# Patient Record
Sex: FEMALE | Race: WHITE | Employment: UNEMPLOYED | URBAN - METROPOLITAN AREA
[De-identification: names, ages, dates, MRNs, and addresses within clinical notes are randomized per-mention and may not be internally consistent; named-entity substitution may affect disease eponyms.]

---

## 2017-03-06 RX ORDER — METRONIDAZOLE 500 MG/1
500 TABLET ORAL 2 TIMES DAILY
Qty: 14 TABLET | Refills: 0 | Status: SHIPPED | OUTPATIENT
Start: 2017-03-06 | End: 2017-03-13

## 2017-03-14 ENCOUNTER — OFFICE VISIT (OUTPATIENT)
Dept: ORTHOPEDIC SURGERY | Age: 37
End: 2017-03-14
Payer: COMMERCIAL

## 2017-03-14 DIAGNOSIS — G25.81 RESTLESS LEG SYNDROME: Primary | ICD-10-CM

## 2017-03-14 PROCEDURE — 99212 OFFICE O/P EST SF 10 MIN: CPT | Performed by: ORTHOPAEDIC SURGERY

## 2017-03-14 ASSESSMENT — ENCOUNTER SYMPTOMS: SHORTNESS OF BREATH: 0

## 2017-04-05 RX ORDER — METRONIDAZOLE 500 MG/1
500 TABLET ORAL 2 TIMES DAILY
Qty: 14 TABLET | Refills: 0 | Status: SHIPPED | OUTPATIENT
Start: 2017-04-05 | End: 2017-04-11

## 2017-04-11 ENCOUNTER — OFFICE VISIT (OUTPATIENT)
Dept: OBGYN CLINIC | Age: 37
End: 2017-04-11
Payer: COMMERCIAL

## 2017-04-11 ENCOUNTER — HOSPITAL ENCOUNTER (OUTPATIENT)
Age: 37
Setting detail: SPECIMEN
Discharge: HOME | End: 2017-04-11
Payer: COMMERCIAL

## 2017-04-11 ENCOUNTER — HOSPITAL ENCOUNTER (OUTPATIENT)
Age: 37
Discharge: HOME | End: 2017-04-11
Payer: COMMERCIAL

## 2017-04-11 VITALS
BODY MASS INDEX: 23.54 KG/M2 | HEIGHT: 67 IN | SYSTOLIC BLOOD PRESSURE: 102 MMHG | DIASTOLIC BLOOD PRESSURE: 70 MMHG | WEIGHT: 150 LBS | RESPIRATION RATE: 16 BRPM | HEART RATE: 78 BPM

## 2017-04-11 DIAGNOSIS — N76.0 ACUTE VAGINITIS: Primary | ICD-10-CM

## 2017-04-11 DIAGNOSIS — Z20.2 POSSIBLE EXPOSURE TO STD: ICD-10-CM

## 2017-04-11 DIAGNOSIS — N76.0 ACUTE VAGINITIS: ICD-10-CM

## 2017-04-11 LAB
DIRECT EXAM: ABNORMAL
HEPATITIS C ANTIBODY: NONREACTIVE
HIV AG/AB: NONREACTIVE
Lab: ABNORMAL
SPECIMEN DESCRIPTION: ABNORMAL
SPECIMEN DESCRIPTION: ABNORMAL
STATUS: ABNORMAL
T. PALLIDUM, IGG: NONREACTIVE

## 2017-04-11 PROCEDURE — 87510 GARDNER VAG DNA DIR PROBE: CPT

## 2017-04-11 PROCEDURE — 99213 OFFICE O/P EST LOW 20 MIN: CPT | Performed by: NURSE PRACTITIONER

## 2017-04-11 PROCEDURE — 87480 CANDIDA DNA DIR PROBE: CPT

## 2017-04-11 PROCEDURE — 86803 HEPATITIS C AB TEST: CPT

## 2017-04-11 PROCEDURE — 87660 TRICHOMONAS VAGIN DIR PROBE: CPT

## 2017-04-11 PROCEDURE — 36415 COLL VENOUS BLD VENIPUNCTURE: CPT

## 2017-04-11 PROCEDURE — 87389 HIV-1 AG W/HIV-1&-2 AB AG IA: CPT

## 2017-04-11 PROCEDURE — 86694 HERPES SIMPLEX NES ANTBDY: CPT

## 2017-04-11 PROCEDURE — 86780 TREPONEMA PALLIDUM: CPT

## 2017-04-11 PROCEDURE — 86695 HERPES SIMPLEX TYPE 1 TEST: CPT

## 2017-04-11 PROCEDURE — 86696 HERPES SIMPLEX TYPE 2 TEST: CPT

## 2017-04-11 PROCEDURE — 87491 CHLMYD TRACH DNA AMP PROBE: CPT

## 2017-04-11 PROCEDURE — 87591 N.GONORRHOEAE DNA AMP PROB: CPT

## 2017-04-12 ENCOUNTER — TELEPHONE (OUTPATIENT)
Dept: OBGYN CLINIC | Age: 37
End: 2017-04-12

## 2017-04-12 PROBLEM — B00.9 ROUTINE CULTURE POSITIVE FOR HERPES SIMPLEX VIRUS TYPE 1 (HSV-1): Status: ACTIVE | Noted: 2017-04-12

## 2017-04-12 PROBLEM — B00.9 HSV-1 INFECTION: Status: ACTIVE | Noted: 2017-04-12

## 2017-04-12 PROBLEM — R76.8 HSV-2 SEROPOSITIVE: Status: ACTIVE | Noted: 2017-04-12

## 2017-04-12 LAB
C TRACH DNA GENITAL QL NAA+PROBE: NEGATIVE
HERPES SIMPLEX VIRUS 1 IGG: 3.06
HERPES SIMPLEX VIRUS 2 IGG: 5.4
HERPES TYPE 1/2 IGM COMBINED: 1.41
N. GONORRHOEAE DNA: NEGATIVE

## 2017-04-12 RX ORDER — METRONIDAZOLE 500 MG/1
500 TABLET ORAL 2 TIMES DAILY
Qty: 14 TABLET | Refills: 0 | Status: SHIPPED | OUTPATIENT
Start: 2017-04-12 | End: 2017-04-19

## 2017-09-20 ENCOUNTER — OFFICE VISIT (OUTPATIENT)
Dept: OBGYN CLINIC | Age: 37
End: 2017-09-20
Payer: COMMERCIAL

## 2017-09-20 ENCOUNTER — HOSPITAL ENCOUNTER (OUTPATIENT)
Age: 37
Setting detail: SPECIMEN
Discharge: HOME | End: 2017-09-20
Payer: COMMERCIAL

## 2017-09-20 VITALS
BODY MASS INDEX: 23.49 KG/M2 | HEART RATE: 70 BPM | DIASTOLIC BLOOD PRESSURE: 64 MMHG | HEIGHT: 68 IN | WEIGHT: 155 LBS | SYSTOLIC BLOOD PRESSURE: 100 MMHG

## 2017-09-20 DIAGNOSIS — N89.8 VAGINAL DISCHARGE: ICD-10-CM

## 2017-09-20 DIAGNOSIS — Z01.419 ROUTINE GYNECOLOGICAL EXAMINATION: Primary | ICD-10-CM

## 2017-09-20 LAB
DIRECT EXAM: ABNORMAL
Lab: ABNORMAL
SPECIMEN DESCRIPTION: ABNORMAL
SPECIMEN DESCRIPTION: ABNORMAL
STATUS: ABNORMAL

## 2017-09-20 PROCEDURE — 87491 CHLMYD TRACH DNA AMP PROBE: CPT

## 2017-09-20 PROCEDURE — 87591 N.GONORRHOEAE DNA AMP PROB: CPT

## 2017-09-20 PROCEDURE — 87660 TRICHOMONAS VAGIN DIR PROBE: CPT

## 2017-09-20 PROCEDURE — 99214 OFFICE O/P EST MOD 30 MIN: CPT | Performed by: NURSE PRACTITIONER

## 2017-09-20 PROCEDURE — 87510 GARDNER VAG DNA DIR PROBE: CPT

## 2017-09-20 PROCEDURE — 87480 CANDIDA DNA DIR PROBE: CPT

## 2017-09-20 RX ORDER — METRONIDAZOLE 500 MG/1
500 TABLET ORAL 2 TIMES DAILY
Qty: 14 TABLET | Refills: 0 | Status: SHIPPED | OUTPATIENT
Start: 2017-09-20 | End: 2017-09-27

## 2017-09-20 ASSESSMENT — PATIENT HEALTH QUESTIONNAIRE - PHQ9
1. LITTLE INTEREST OR PLEASURE IN DOING THINGS: 0
SUM OF ALL RESPONSES TO PHQ9 QUESTIONS 1 & 2: 0
SUM OF ALL RESPONSES TO PHQ QUESTIONS 1-9: 0
2. FEELING DOWN, DEPRESSED OR HOPELESS: 0

## 2017-09-21 ENCOUNTER — TELEPHONE (OUTPATIENT)
Dept: OBGYN CLINIC | Age: 37
End: 2017-09-21

## 2017-09-21 LAB
C TRACH DNA GENITAL QL NAA+PROBE: NEGATIVE
N. GONORRHOEAE DNA: NEGATIVE

## 2018-06-14 ENCOUNTER — TELEPHONE (OUTPATIENT)
Dept: OBGYN CLINIC | Age: 38
End: 2018-06-14

## 2018-06-14 RX ORDER — METRONIDAZOLE 500 MG/1
500 TABLET ORAL 2 TIMES DAILY
Qty: 14 TABLET | Refills: 0 | Status: SHIPPED | OUTPATIENT
Start: 2018-06-14 | End: 2018-06-21

## 2018-09-26 ENCOUNTER — TELEPHONE (OUTPATIENT)
Dept: OBGYN CLINIC | Age: 38
End: 2018-09-26

## 2018-10-01 DIAGNOSIS — S32.599D CLOSED FRACTURE OF INFERIOR PUBIC RAMUS, UNSPECIFIED LATERALITY, WITH ROUTINE HEALING, SUBSEQUENT ENCOUNTER: Primary | ICD-10-CM

## 2018-12-19 ENCOUNTER — TELEPHONE (OUTPATIENT)
Dept: OBGYN CLINIC | Age: 38
End: 2018-12-19

## 2018-12-19 RX ORDER — METRONIDAZOLE 500 MG/1
500 TABLET ORAL 2 TIMES DAILY
Qty: 14 TABLET | Refills: 0 | Status: SHIPPED | OUTPATIENT
Start: 2018-12-19 | End: 2018-12-26

## 2020-08-18 ENCOUNTER — HOSPITAL ENCOUNTER (EMERGENCY)
Age: 40
Discharge: HOME | End: 2020-08-18
Attending: EMERGENCY MEDICINE
Payer: COMMERCIAL

## 2020-08-18 VITALS
WEIGHT: 145 LBS | TEMPERATURE: 97.9 F | DIASTOLIC BLOOD PRESSURE: 79 MMHG | BODY MASS INDEX: 22.76 KG/M2 | HEIGHT: 67 IN | OXYGEN SATURATION: 100 % | SYSTOLIC BLOOD PRESSURE: 124 MMHG | RESPIRATION RATE: 16 BRPM | HEART RATE: 83 BPM

## 2020-08-18 PROCEDURE — 99283 EMERGENCY DEPT VISIT LOW MDM: CPT

## 2020-08-18 PROCEDURE — 6370000000 HC RX 637 (ALT 250 FOR IP): Performed by: STUDENT IN AN ORGANIZED HEALTH CARE EDUCATION/TRAINING PROGRAM

## 2020-08-18 RX ORDER — CYCLOBENZAPRINE HCL 5 MG
5 TABLET ORAL 2 TIMES DAILY PRN
Qty: 10 TABLET | Refills: 0 | Status: SHIPPED | OUTPATIENT
Start: 2020-08-18 | End: 2020-08-28

## 2020-08-18 RX ORDER — IBUPROFEN 800 MG/1
800 TABLET ORAL ONCE
Status: COMPLETED | OUTPATIENT
Start: 2020-08-18 | End: 2020-08-18

## 2020-08-18 RX ORDER — IBUPROFEN 800 MG/1
800 TABLET ORAL 3 TIMES DAILY PRN
Qty: 40 TABLET | Refills: 0 | Status: SHIPPED | OUTPATIENT
Start: 2020-08-18

## 2020-08-18 RX ADMIN — IBUPROFEN 800 MG: 800 TABLET, FILM COATED ORAL at 19:48

## 2020-08-18 ASSESSMENT — PAIN SCALES - GENERAL
PAINLEVEL_OUTOF10: 8
PAINLEVEL_OUTOF10: 8

## 2020-08-18 ASSESSMENT — ENCOUNTER SYMPTOMS
SHORTNESS OF BREATH: 0
ABDOMINAL PAIN: 0
BACK PAIN: 1

## 2020-08-18 ASSESSMENT — PAIN DESCRIPTION - ORIENTATION: ORIENTATION: LEFT;MID

## 2020-08-18 ASSESSMENT — PAIN DESCRIPTION - LOCATION: LOCATION: BACK;SHOULDER

## 2020-08-18 ASSESSMENT — PAIN DESCRIPTION - PAIN TYPE: TYPE: ACUTE PAIN

## 2020-08-18 ASSESSMENT — PAIN DESCRIPTION - DESCRIPTORS: DESCRIPTORS: ACHING;SHARP;NUMBNESS

## 2020-08-18 NOTE — ED PROVIDER NOTES
[facility] [address] [facility]                                      Emergency Department                                      Faculty Attestation                                         I performed a history and physical examination of the patient and discussed management with the resident. I reviewed the residents note and agree with the documented findings and plan of care. Any areas of disagreement are noted on the chart. I was personally present for the key portions of any procedures. I have documented in the chart those procedures where I was not present during the key portions. I agree with the chief complaint, past medical history, past surgical history, allergies, medications, social and family history as documented unless otherwise noted below. For mid-level providers such as nurse practitioners as well as physicians assistants:    I have personally seen and evaluated the patient. I find the patient's history and physical exam are consistent with NP/PA documentation. I agree with the care provided, treatment rendered, disposition, & follow-up plan. Additional findings are as noted  61-year-old female with left-sided back pain. Worse with movement of the left upper extremity. No chest pain, shortness of breath, diaphoresis, or other complaints. Tender palpation over the left rhomboids. Reproduces the pain exactly. Neurovascular and tendon intact. No evidence of trauma. No bony tenderness.      Silas Haddad DO  08/18/20 1944

## 2020-08-18 NOTE — ED PROVIDER NOTES
[facility] ED  Emergency Department Encounter  EmergencyMedicine Resident     Pt Name:Vikki Hanna  MRN: [id number]  [place] 1980  Date of evaluation: 8/18/20  PCP:  Amira Chin DO    CHIEF COMPLAINT       Chief Complaint   Patient presents with    Back Pain    Shoulder Pain       HISTORY OF PRESENT ILLNESS  (Location/Symptom, Timing/Onset, Context/Setting, Quality, Duration, Modifying Factors, Severity.)      Vito Salinas is a 42 y.o. female who presents with left sided back pain. Patient complaining of left sided back pain x1 day. States she had a muscle spasm in upper left back yesterday that gradually worsened and radiated to left shoulder and laterally. Exacerbated by movement. Partially relieved by ibuprofen. Denies sob, chest pain, diaphoresis, lightheadedness, trauma. PAST MEDICAL / SURGICAL / SOCIAL / FAMILY HISTORY      has a past medical history of Alcohol abuse, Anemia due to acute blood loss, Anxiety, C7 cervical fracture (HCC), Closed fracture of one rib, Right First, Delirium, Delirium, Drug abuse ([address] Fracture of left frontal bone ([address] History of infection with vancomycin resistant Enterococcus (VRE), Iliac artery thrombosis, right (HCC), L1-L5 Lumbar transverse process fracture ([address] Left Sacral fracture, closed ([address] Nasal bone fracture-commuinuted, Psychiatric problem, Right Inferior pubic ramus fracture ([address] Scalp laceration, Sphenoid sinus fracture ([address] T9-T10 Thoracic vertebral fracture ([address] and Thrombosis of right femoral vein ([address] has a past surgical history that includes Tubal ligation (6/13/08); skin split graft (10/17/2014); Trunk Debridement (10/17/14); other surgical history (12-17-14); hip surgery (2014); Pelvic fracture surgery; and orthopedic surgery (Right, 2/22/2016).     Social History     Socioeconomic History    Marital status: Legally      Spouse name: Not on file    Number of children: Not on file    Medications:  Prior to Admission medications    Medication Sig Start Date End Date Taking? Authorizing Provider   cyclobenzaprine (FLEXERIL) 5 MG tablet Take 1 tablet by mouth 2 times daily as needed for Muscle spasms 8/18/20 8/28/20 Yes Tulio Bell MD   ibuprofen (ADVIL;MOTRIN) 800 MG tablet Take 1 tablet by mouth 3 times daily as needed for Pain 8/18/20  Yes Tulio Bell MD       REVIEW OF SYSTEMS    (2-9 systems for level 4, 10 or more for level 5)      Review of Systems   Respiratory: Negative for shortness of breath. Cardiovascular: Negative for chest pain and palpitations. Gastrointestinal: Negative for abdominal pain. Musculoskeletal: Positive for back pain. Neurological: Negative for weakness, light-headedness, numbness and headaches. PHYSICAL EXAM   (up to 7 for level 4, 8 or more for level 5)      INITIAL VITALS:   /79   Pulse 83   Temp 97.9 °F (36.6 °C) (Oral)   Resp 16   Ht 5' 7\" (1.702 m)   Wt 145 lb (65.8 kg)   LMP 08/09/2020 (Approximate)   SpO2 100%   BMI 22.71 kg/m²     Physical Exam  Constitutional:       General: She is not in acute distress. Appearance: She is normal weight. Cardiovascular:      Rate and Rhythm: Normal rate and regular rhythm. Pulmonary:      Effort: Pulmonary effort is normal.      Breath sounds: Normal breath sounds. Musculoskeletal:         General: Tenderness (palpation left interscapular, paraspinal traps extending laterally, no sensory or motor deficits noted) present. DIFFERENTIAL  DIAGNOSIS     PLAN (LABS / IMAGING / EKG):  No orders of the defined types were placed in this encounter.       MEDICATIONS ORDERED:  Orders Placed This Encounter   Medications    ibuprofen (ADVIL;MOTRIN) tablet 800 mg    cyclobenzaprine (FLEXERIL) 5 MG tablet     Sig: Take 1 tablet by mouth 2 times daily as needed for Muscle spasms     Dispense:  10 tablet     Refill:  0    ibuprofen (ADVIL;MOTRIN) 800 MG tablet     Sig: Take 1 tablet by mouth 3 times daily as needed for Pain     Dispense:  40 tablet     Refill:  0       DDX:  MSK pain - back spasm  Atypical chest pain       DIAGNOSTIC RESULTS / EMERGENCY DEPARTMENT COURSE / MDM     LABS:  No results found for this visit on 08/18/20. RADIOLOGY:  No results found. EKG  None    All EKG's are interpreted by the Emergency Department Physician who either signs or Co-signs this chart in the absence of a cardiologist.    EMERGENCY DEPARTMENT COURSE:  43 yo female presents with left sided back pain. Vitals stable. Upon exam, pain reproducible with palpation of left upper back. Ibuprofen given. Discharged home with flexeril and ibuprofen.           PROCEDURES:   Procedures    CONSULTS:  None    CRITICAL CARE:  None    FINAL IMPRESSION      Muscle spasm, back    DISPOSITION / PLAN     DISPOSITION Decision To Discharge 08/18/2020 07:46:33 PM  HOME     PATIENT REFERRED TO:  Izabel Sotelo, [address] [id number] [address] [facility] [address]  [phone]    In 1 week  As needed      DISCHARGE MEDICATIONS:  Discharge Medication List as of 8/18/2020  7:57 PM      START taking these medications    Details   cyclobenzaprine (FLEXERIL) 5 MG tablet Take 1 tablet by mouth 2 times daily as needed for Muscle spasms, Disp-10 tablet,R-0Print      ibuprofen (ADVIL;MOTRIN) 800 MG tablet Take 1 tablet by mouth 3 times daily as needed for Pain, Disp-40 tablet,R-0Print             Zana Rain MD  Family Medicine Resident    (Please note that portions ofthis note were completed with a voice recognition program.  Efforts were made to edit the dictations but occasionally words are mis-transcribed.)       Zana Rain MD  Resident  08/18/20 5082 no abdominal pain, no bloating, no constipation, no diarrhea, no nausea and no vomiting.

## 2023-10-05 ENCOUNTER — OFFICE VISIT (OUTPATIENT)
Dept: OBGYN CLINIC | Age: 43
End: 2023-10-05

## 2023-10-05 VITALS
SYSTOLIC BLOOD PRESSURE: 112 MMHG | HEIGHT: 67 IN | DIASTOLIC BLOOD PRESSURE: 68 MMHG | BODY MASS INDEX: 25.58 KG/M2 | WEIGHT: 163 LBS

## 2023-10-05 DIAGNOSIS — N92.1 MENORRHAGIA WITH IRREGULAR CYCLE: Primary | ICD-10-CM

## 2023-10-05 PROBLEM — F19.99: Status: ACTIVE | Noted: 2023-03-06

## 2023-10-05 NOTE — PROGRESS NOTES
10/5/2024    CBC with Auto Differential     Standing Status:   Future     Standing Expiration Date:   10/5/2024    TSH with Reflex     Standing Status:   Future     Standing Expiration Date:   10/4/2024    HCG, Quantitative, Pregnancy     Standing Status:   Future     Standing Expiration Date:   10/4/2024    Protime-INR     Standing Status:   Future     Standing Expiration Date:   10/4/2024     Order Specific Question:   Daily Coumadin Dose? Answer:   N    APTT     Standing Status:   Future     Standing Expiration Date:   10/4/2024     Order Specific Question:   Daily Heparin Dose? Answer:   N           The patient, Luann Gandara is a 41 y.o. female, was seen with a total time spent of 30 minutes for the visit on this date of service by the E/M provider. The time component had both face to face and non face to face time spent in determining the total time component. Counseling and education regarding her diagnosis listed below and her options regarding those diagnoses were also included in determining her time component. Diagnosis Orders   1. Menorrhagia with irregular cycle  US PELVIS COMPLETE    US NON OB TRANSVAGINAL    CBC with Auto Differential    TSH with Reflex    HCG, Quantitative, Pregnancy    Protime-INR    APTT           The patient had her preventative health maintenance recommendations and follow-up reviewed with her at the completion of her visit.

## 2024-05-28 ENCOUNTER — HOSPITAL ENCOUNTER (OUTPATIENT)
Age: 44
Setting detail: SPECIMEN
Discharge: HOME | End: 2024-05-28

## 2024-05-28 ENCOUNTER — OFFICE VISIT (OUTPATIENT)
Dept: OBGYN CLINIC | Age: 44
End: 2024-05-28
Payer: COMMERCIAL

## 2024-05-28 VITALS
HEIGHT: 67 IN | WEIGHT: 179 LBS | DIASTOLIC BLOOD PRESSURE: 80 MMHG | SYSTOLIC BLOOD PRESSURE: 130 MMHG | BODY MASS INDEX: 28.09 KG/M2

## 2024-05-28 DIAGNOSIS — N92.0 MENORRHAGIA WITH REGULAR CYCLE: ICD-10-CM

## 2024-05-28 DIAGNOSIS — Z11.51 SPECIAL SCREENING EXAMINATION FOR HUMAN PAPILLOMAVIRUS (HPV): ICD-10-CM

## 2024-05-28 DIAGNOSIS — Z12.31 ENCOUNTER FOR SCREENING MAMMOGRAM FOR MALIGNANT NEOPLASM OF BREAST: ICD-10-CM

## 2024-05-28 DIAGNOSIS — Z01.419 WELL FEMALE EXAM WITH ROUTINE GYNECOLOGICAL EXAM: Primary | ICD-10-CM

## 2024-05-28 PROCEDURE — 99396 PREV VISIT EST AGE 40-64: CPT | Performed by: NURSE PRACTITIONER

## 2024-05-28 ASSESSMENT — PATIENT HEALTH QUESTIONNAIRE - PHQ9
SUM OF ALL RESPONSES TO PHQ QUESTIONS 1-9: 0
SUM OF ALL RESPONSES TO PHQ9 QUESTIONS 1 & 2: 0
2. FEELING DOWN, DEPRESSED OR HOPELESS: NOT AT ALL
SUM OF ALL RESPONSES TO PHQ QUESTIONS 1-9: 0
1. LITTLE INTEREST OR PLEASURE IN DOING THINGS: NOT AT ALL
SUM OF ALL RESPONSES TO PHQ QUESTIONS 1-9: 0
SUM OF ALL RESPONSES TO PHQ QUESTIONS 1-9: 0

## 2024-05-28 NOTE — PROGRESS NOTES
History and Physical  [facility]  [facility] [place] [address]  [place], [place]  [address] [phone]   Fax [phone]  Vikki Montgomery  2022              43 y.o.  Chief Complaint   Patient presents with    Annual Exam       Patient's last menstrual period was 2024 (exact date).             Primary Care Physician: No primary care provider on file.    The patient was seen and examined. She has no chief complaint today and is here for her annual exam.  Her bowels are regular. There are no voiding complaints. She denies any bloating.  She denies vaginal discharge and was counseled on STD's and the need for barrier contraception.     HPI : Vikki Montgomery is a 43 y.o. female     Annual exam  Complaining of heavy, painful periods. Periods occur every month, lasting 5-6 days. Using ultra tampon and pad. Has to change every 1 and 1/2 hours. Has to wear 2 pads and a tampon at night to control bleeding.  Passing large clots. Feels light headed, weak, tired, gets headaches during periods.   Interested in endometrial ablation.  Hx of TL. ________________________________________________________________________  OB History    Para Term  AB Living   2 2 2 0 0 2   SAB IAB Ectopic Molar Multiple Live Births   0 0 0 0 0 2      # Outcome Date GA Lbr Vicente/2nd Weight Sex Delivery Anes PTL Lv   2 Term    3.232 kg (7 lb 2 oz) M Vag-Spont   KINGA   1 Term    3.685 kg (8 lb 2 oz) M Vag-Spont   KINGA     Past Medical History:   Diagnosis Date    Alcohol abuse     Anemia due to acute blood loss 10/5/2014    Anxiety     C7 cervical fracture (HCC) 10/6/2014    Closed fracture of one rib, Right First 10/5/2014    Delirium 10/18/2014    Delirium 10/18/2014    Drug abuse (HCC)     Fracture of left frontal bone (HCC) 10/5/2014    History of infection with vancomycin resistant Enterococcus (VRE) 10/28/2014    rectum    Iliac artery thrombosis, right (HCC) 10/14/2014    L1-L5 Lumbar

## 2024-05-31 LAB
HPV I/H RISK 4 DNA CVX QL NAA+PROBE: NOT DETECTED
HPV SAMPLE: NORMAL
HPV, INTERPRETATION: NORMAL
HPV16 DNA CVX QL NAA+PROBE: NOT DETECTED
HPV18 DNA CVX QL NAA+PROBE: NOT DETECTED
SPECIMEN DESCRIPTION: NORMAL

## 2024-06-09 LAB — CYTOLOGY REPORT: NORMAL

## 2024-06-10 ENCOUNTER — TELEPHONE (OUTPATIENT)
Dept: OBGYN CLINIC | Age: 44
End: 2024-06-10

## 2024-06-10 PROBLEM — A59.01 TRICHOMONAS VAGINITIS: Status: ACTIVE | Noted: 2024-06-10

## 2024-06-10 NOTE — TELEPHONE ENCOUNTER
----- Message from SEB Torres - CNP sent at 6/10/2024  7:51 AM EDT -----  Pap smear neg  NO HPV detected  AGE 43  Repeat annual exam in 1 year.  Trichomonas  Flagyl 500mg PO BID X 7 days  Test of cure 2-3 weeks  Abstain  Partner needs treated

## 2024-06-10 NOTE — TELEPHONE ENCOUNTER
LM for pt to contact office regarding pap results showing     Pap smear neg  NO HPV detected  AGE 43  Repeat annual exam in 1 year.  Trichomonas  Flagyl 500mg PO BID X 7 days  Test of cure 2-3 weeks  Abstain  Partner needs treated

## 2024-06-13 ENCOUNTER — TELEPHONE (OUTPATIENT)
Dept: OBGYN CLINIC | Age: 44
End: 2024-06-13

## 2024-06-13 NOTE — TELEPHONE ENCOUNTER
LM for pt to contact office regarding test results showing   Pap smear neg  NO HPV detected  AGE 43  Repeat annual exam in 1 year.  Trichomonas  Flagyl 500mg PO BID X 7 days  Test of cure 2-3 weeks  Abstain  Partner needs treated

## 2024-06-20 ENCOUNTER — TELEPHONE (OUTPATIENT)
Dept: OBGYN CLINIC | Age: 44
End: 2024-06-20

## 2024-06-27 ENCOUNTER — TELEPHONE (OUTPATIENT)
Dept: OBGYN CLINIC | Age: 44
End: 2024-06-27

## 2024-06-27 NOTE — TELEPHONE ENCOUNTER
Patient returned call to the office and was notified of +Trich and recommendations. Patient would like script for flag[place] to [place] [address] [facility] [address]. Partner needs to seek treatment as well as abstain from intercourse. RAMON appointment scheduled for 3 weeks.

## 2024-06-28 RX ORDER — METRONIDAZOLE 500 MG/1
500 TABLET ORAL 2 TIMES DAILY
Qty: 14 TABLET | Refills: 0 | Status: SHIPPED | OUTPATIENT
Start: 2024-06-28 | End: 2024-07-05